# Patient Record
Sex: MALE | Race: BLACK OR AFRICAN AMERICAN | NOT HISPANIC OR LATINO | Employment: FULL TIME | URBAN - METROPOLITAN AREA
[De-identification: names, ages, dates, MRNs, and addresses within clinical notes are randomized per-mention and may not be internally consistent; named-entity substitution may affect disease eponyms.]

---

## 2017-05-31 ENCOUNTER — HOSPITAL ENCOUNTER (EMERGENCY)
Facility: HOSPITAL | Age: 32
Discharge: HOME | End: 2017-05-31
Attending: EMERGENCY MEDICINE

## 2017-05-31 VITALS
HEART RATE: 55 BPM | WEIGHT: 168 LBS | SYSTOLIC BLOOD PRESSURE: 119 MMHG | DIASTOLIC BLOOD PRESSURE: 75 MMHG | TEMPERATURE: 97 F | HEIGHT: 73 IN | OXYGEN SATURATION: 99 % | RESPIRATION RATE: 16 BRPM | BODY MASS INDEX: 22.26 KG/M2

## 2017-05-31 DIAGNOSIS — S46.912A LEFT SHOULDER STRAIN, INITIAL ENCOUNTER: Primary | ICD-10-CM

## 2017-05-31 PROCEDURE — 99283 EMERGENCY DEPT VISIT LOW MDM: CPT | Mod: 25

## 2017-05-31 PROCEDURE — 63600175 PHARM REV CODE 636 W HCPCS: Performed by: NURSE PRACTITIONER

## 2017-05-31 PROCEDURE — 25000003 PHARM REV CODE 250: Performed by: NURSE PRACTITIONER

## 2017-05-31 PROCEDURE — 96372 THER/PROPH/DIAG INJ SC/IM: CPT

## 2017-05-31 RX ORDER — METHOCARBAMOL 500 MG/1
1000 TABLET, FILM COATED ORAL 3 TIMES DAILY
Qty: 30 TABLET | Refills: 0 | Status: SHIPPED | OUTPATIENT
Start: 2017-05-31 | End: 2017-06-05

## 2017-05-31 RX ORDER — NAPROXEN 500 MG/1
500 TABLET ORAL 2 TIMES DAILY WITH MEALS
Qty: 14 TABLET | Refills: 0 | Status: SHIPPED | OUTPATIENT
Start: 2017-05-31

## 2017-05-31 RX ORDER — NAPROXEN 500 MG/1
500 TABLET ORAL
Status: COMPLETED | OUTPATIENT
Start: 2017-05-31 | End: 2017-05-31

## 2017-05-31 RX ORDER — ORPHENADRINE CITRATE 30 MG/ML
60 INJECTION INTRAMUSCULAR; INTRAVENOUS
Status: COMPLETED | OUTPATIENT
Start: 2017-05-31 | End: 2017-05-31

## 2017-05-31 RX ADMIN — NAPROXEN 500 MG: 500 TABLET ORAL at 05:05

## 2017-05-31 RX ADMIN — ORPHENADRINE CITRATE 60 MG: 30 INJECTION INTRAMUSCULAR; INTRAVENOUS at 05:05

## 2017-05-31 NOTE — ED PROVIDER NOTES
Encounter Date: 5/31/2017       History     Chief Complaint   Patient presents with    Shoulder Pain     left shoulder pain after throwing something.  It has happened twice in the last two months.  No deformity noted.     Review of patient's allergies indicates:  No Known Allergies  A 2-year-old male presents to the ED for evaluation of left shoulder pain.  Patient reports that about 2 months ago, he noticed left shoulder pain after throwing a broom.  After rest, the pain improved.  Patient reports that yesterday he was lifting weights, and noticed pain at the same place in his shoulder.  Patient reports pain is located in the anterior shoulder, and is worse with movement.  He has tried no medication for his pain.  He denies trauma, fevers, weakness, numbness/tingling, chest pain, shortness of breath, palpitations, and rash.  He has never seen a physician for his shoulder pain.  No previous shoulder injury.       The history is provided by the patient.   Arm Injury    The incident occurred yesterday. Injury mechanism: Lifting weights. The wounds were self-inflicted. No protective equipment was used. He came to the ER via by private vehicle. There is an injury to the left shoulder. There have been no prior injuries to these areas. He is right-handed. He has been behaving normally. He has received no recent medical care. He reports no foreign bodies present. Pertinent negatives include no chest pain, no numbness, no abdominal pain, no nausea, no vomiting, no headaches, no neck pain, no focal weakness, no decreased responsiveness, no light-headedness, no loss of consciousness, no tingling, no weakness, no cough and no difficulty breathing.     History reviewed. No pertinent past medical history.  History reviewed. No pertinent surgical history.  History reviewed. No pertinent family history.  Social History   Substance Use Topics    Smoking status: Former Smoker    Smokeless tobacco: Not on file    Alcohol use Yes       Comment: social     Review of Systems   Constitutional: Negative for activity change, decreased responsiveness, fatigue and fever.   HENT: Negative for congestion and sore throat.    Respiratory: Negative for cough, chest tightness and shortness of breath.    Cardiovascular: Negative for chest pain.   Gastrointestinal: Negative for abdominal pain, diarrhea, nausea and vomiting.   Musculoskeletal: Positive for arthralgias (left shoulder pain). Negative for back pain, joint swelling, myalgias and neck pain.   Skin: Negative.  Negative for rash and wound.   Allergic/Immunologic: Negative for immunocompromised state.   Neurological: Negative for tingling, focal weakness, loss of consciousness, weakness, light-headedness, numbness and headaches.   Psychiatric/Behavioral: Negative for confusion.   All other systems reviewed and are negative.      Physical Exam     Initial Vitals [05/31/17 1628]   BP Pulse Resp Temp SpO2   119/75 (!) 55 16 97.3 °F (36.3 °C) 99 %     Physical Exam    Nursing note and vitals reviewed.  Constitutional: Vital signs are normal. He appears well-developed and well-nourished. He is active and cooperative. He is easily aroused.  Non-toxic appearance. He does not have a sickly appearance. He does not appear ill. No distress.   HENT:   Head: Normocephalic and atraumatic.   Eyes: Conjunctivae are normal.   Neck: Normal range of motion.   Cardiovascular: Normal rate, regular rhythm and normal heart sounds.   Pulses:       Radial pulses are 2+ on the right side, and 2+ on the left side.   Pulmonary/Chest: Effort normal and breath sounds normal.   Abdominal: Soft. Normal appearance and bowel sounds are normal. There is no tenderness.   Musculoskeletal:        Right shoulder: Normal.        Left shoulder: He exhibits tenderness, pain and spasm. He exhibits normal range of motion, no bony tenderness, no swelling, no effusion, no crepitus, no deformity, no laceration, normal pulse and normal strength.         Right elbow: Normal.       Left elbow: Normal.        Cervical back: Normal.        Thoracic back: Normal.        Right upper arm: Normal.        Left upper arm: Normal.        Arms:  Neurological: He is alert, oriented to person, place, and time and easily aroused. He has normal strength. No sensory deficit. GCS eye subscore is 4. GCS verbal subscore is 5. GCS motor subscore is 6.   Skin: Skin is warm, dry and intact. Capillary refill takes less than 2 seconds. No abrasion, no bruising, no ecchymosis and no rash noted. No erythema.   Psychiatric: He has a normal mood and affect. His speech is normal and behavior is normal. Judgment and thought content normal. Cognition and memory are normal.         ED Course   Procedures  Labs Reviewed - No data to display          Medical Decision Making:   Initial Assessment:   32-year-old male here for shoulder pain after lifting weights.  Sensation and strength intact bilateral upper and lower extremities.  Neck normal without midline bony tenderness.  Left shoulder trapezius tenderness with spasm.  Full active range of motion to bilateral shoulders.  Patient reports pain with extension toward flexion.  Differential Diagnosis:   Strain, sprain, fracture, spasm, internal derangement  ED Management:  IM Norflex, by mouth Naprosyn  Patient reports improvement in pain after ED interventions.  No indication for imaging at this time.  I advised RICE and follow-up with [facility] within 2-3 days.  Rx Naprosyn and Robaxin.  Feel the patient likely strained his left shoulder.  Reviewed strict return precautions with the patient.  Patient verbalized understanding, compliance, and agreement with the treatment plan.                   ED Course     Clinical Impression:   The encounter diagnosis was Left shoulder strain, initial encounter.          Sarah Chua, [place]  05/31/17 [id number]

## 2017-08-27 ENCOUNTER — HOSPITAL ENCOUNTER (EMERGENCY)
Facility: HOSPITAL | Age: 32
Discharge: HOME | End: 2017-08-27
Attending: EMERGENCY MEDICINE

## 2017-08-27 VITALS
HEIGHT: 73 IN | DIASTOLIC BLOOD PRESSURE: 65 MMHG | TEMPERATURE: 98 F | RESPIRATION RATE: 18 BRPM | OXYGEN SATURATION: 98 % | HEART RATE: 65 BPM | SYSTOLIC BLOOD PRESSURE: 111 MMHG | BODY MASS INDEX: 20.67 KG/M2 | WEIGHT: 156 LBS

## 2017-08-27 DIAGNOSIS — M25.512 ACUTE PAIN OF LEFT SHOULDER: Primary | ICD-10-CM

## 2017-08-27 PROCEDURE — 99283 EMERGENCY DEPT VISIT LOW MDM: CPT

## 2017-08-27 RX ORDER — IBUPROFEN 600 MG/1
600 TABLET ORAL EVERY 6 HOURS PRN
Qty: 15 TABLET | Refills: 0 | Status: SHIPPED | OUTPATIENT
Start: 2017-08-27

## 2017-08-27 NOTE — ED TRIAGE NOTES
"pt stated that his left shoulder has a "pulling" pain. , pt states hurts more when lifting weights, pt states pain has been " in the shoulder since April, MD at bedside showed pt and family at bedside proper body mechanics for lifting weight over head   "

## 2017-08-27 NOTE — ED PROVIDER NOTES
"Encounter Date: 8/27/2017       History     Chief Complaint   Patient presents with    Shoulder Pain     pt stated that his left shoulder has a "pulling" pain.      Patient is a 32-year-old male who complains of nontraumatic pain to his left shoulder.  He has increased pain when he raises his left arm above his head.  She says he notices this pain predominantly when he is lifting weights.  He denies any numbness.  No other joint problems.  He has had this same pain in the shoulder in the past.      The history is provided by the patient.     Review of patient's allergies indicates:  No Known Allergies  History reviewed. No pertinent past medical history.  History reviewed. No pertinent surgical history.  History reviewed. No pertinent family history.  Social History   Substance Use Topics    Smoking status: Former Smoker    Smokeless tobacco: Not on file    Alcohol use Yes      Comment: social     Review of Systems   Constitutional: Negative for chills and fever.   Musculoskeletal:        Left shoulder pain.   Skin: Negative for color change and rash.   Neurological: Negative for numbness.   All other systems reviewed and are negative.      Physical Exam     Initial Vitals [08/27/17 1531]   BP Pulse Resp Temp SpO2   111/65 65 18 97.8 °F (36.6 °C) 98 %      MAP       80.33         Physical Exam    Nursing note and vitals reviewed.  Constitutional: No distress.   HENT:   Head: Normocephalic and atraumatic.   Eyes: EOM are normal.   Neck: Normal range of motion. Neck supple.   Cardiovascular: Normal rate, regular rhythm and normal heart sounds.   Pulmonary/Chest: Breath sounds normal.   Musculoskeletal: Normal range of motion.   No left shoulder swelling and deformity.  Left shoulder is nontender.  Neurovascular intact.   Neurological: He is alert and oriented to person, place, and time.   Skin: Skin is warm and dry.   Psychiatric: His behavior is normal. Thought content normal.         ED Course   Procedures  Labs " Reviewed - No data to display                            ED Course     Clinical Impression:   The encounter diagnosis was Acute pain of left shoulder.                           Wilton Beaulieu MD  08/27/17 6558